# Patient Record
Sex: FEMALE | Race: OTHER | URBAN - METROPOLITAN AREA
[De-identification: names, ages, dates, MRNs, and addresses within clinical notes are randomized per-mention and may not be internally consistent; named-entity substitution may affect disease eponyms.]

---

## 2023-07-24 ENCOUNTER — OFFICE VISIT (OUTPATIENT)
Dept: ONCOLOGY | Age: 53
End: 2023-07-24
Payer: COMMERCIAL

## 2023-07-24 VITALS
SYSTOLIC BLOOD PRESSURE: 134 MMHG | WEIGHT: 163.7 LBS | TEMPERATURE: 98.9 F | HEIGHT: 60 IN | DIASTOLIC BLOOD PRESSURE: 84 MMHG | OXYGEN SATURATION: 99 % | RESPIRATION RATE: 12 BRPM | BODY MASS INDEX: 32.14 KG/M2 | HEART RATE: 69 BPM

## 2023-07-24 DIAGNOSIS — M25.569 KNEE PAIN, UNSPECIFIED CHRONICITY, UNSPECIFIED LATERALITY: ICD-10-CM

## 2023-07-24 DIAGNOSIS — C50.919 MALIGNANT NEOPLASM OF FEMALE BREAST, UNSPECIFIED ESTROGEN RECEPTOR STATUS, UNSPECIFIED LATERALITY, UNSPECIFIED SITE OF BREAST (HCC): ICD-10-CM

## 2023-07-24 DIAGNOSIS — C50.919 MALIGNANT NEOPLASM OF FEMALE BREAST, UNSPECIFIED ESTROGEN RECEPTOR STATUS, UNSPECIFIED LATERALITY, UNSPECIFIED SITE OF BREAST (HCC): Primary | ICD-10-CM

## 2023-07-24 DIAGNOSIS — R97.8 ABNORMAL TUMOR MARKERS: ICD-10-CM

## 2023-07-24 DIAGNOSIS — R94.8 ABNORMAL POSITRON EMISSION TOMOGRAPHY (PET) SCAN: ICD-10-CM

## 2023-07-24 DIAGNOSIS — M25.569 KNEE PAIN, UNSPECIFIED CHRONICITY, UNSPECIFIED LATERALITY: Primary | ICD-10-CM

## 2023-07-24 PROCEDURE — G8417 CALC BMI ABV UP PARAM F/U: HCPCS | Performed by: INTERNAL MEDICINE

## 2023-07-24 PROCEDURE — 1036F TOBACCO NON-USER: CPT | Performed by: INTERNAL MEDICINE

## 2023-07-24 PROCEDURE — 99205 OFFICE O/P NEW HI 60 MIN: CPT | Performed by: INTERNAL MEDICINE

## 2023-07-24 PROCEDURE — G8427 DOCREV CUR MEDS BY ELIG CLIN: HCPCS | Performed by: INTERNAL MEDICINE

## 2023-07-24 PROCEDURE — 3017F COLORECTAL CA SCREEN DOC REV: CPT | Performed by: INTERNAL MEDICINE

## 2023-07-24 RX ORDER — ABEMACICLIB 150 MG/1
TABLET ORAL
COMMUNITY
Start: 2023-07-17

## 2023-07-24 RX ORDER — ASCORBIC ACID 500 MG
500 TABLET ORAL DAILY
COMMUNITY

## 2023-07-24 RX ORDER — CALCIUM CARBONATE 500(1250)
500 TABLET ORAL DAILY
COMMUNITY

## 2023-07-24 RX ORDER — LAMOTRIGINE 25 MG/1
500 TABLET ORAL ONCE
COMMUNITY

## 2023-07-24 ASSESSMENT — PATIENT HEALTH QUESTIONNAIRE - PHQ9
SUM OF ALL RESPONSES TO PHQ9 QUESTIONS 1 & 2: 0
SUM OF ALL RESPONSES TO PHQ QUESTIONS 1-9: 0
2. FEELING DOWN, DEPRESSED OR HOPELESS: 0
SUM OF ALL RESPONSES TO PHQ QUESTIONS 1-9: 0
1. LITTLE INTEREST OR PLEASURE IN DOING THINGS: 0
SUM OF ALL RESPONSES TO PHQ QUESTIONS 1-9: 0
SUM OF ALL RESPONSES TO PHQ QUESTIONS 1-9: 0

## 2023-07-24 NOTE — PATIENT INSTRUCTIONS
Patient Instructions from Today's Visit    Reason for Visit:  New Patient. Diagnosis Information:  [email or web address] net/about-us/asco-answers-patient-education-materials/pkve-esgxmov-adza-sheets    Plan:  History reviewed. Proceed with fulvestrant, abemaciclib, and xgeva. PET scan soon. You can call to schedule this at [phone]. Recommend continuing with mammograms - you can call to schedule this at [phone]. Follow Up: Injection next week. Recent Lab Results:  N/A    Treatment Summary has been discussed and given to patient: N/A        -------------------------------------------------------------------------------------------------------------------  Please call our office at [phone] if you have any  of the following symptoms:   Fever of 100.5 or greater  Chills  Shortness of breath  Swelling or pain in one leg    After office hours an answering service is available and will contact a provider for emergencies or if you are experiencing any of the above symptoms. Patient did express an interest in My Chart. My Chart log in information explained on the after visit summary printout at the [address] [facility] desk.     Sumeet Nichols RN

## 2023-07-24 NOTE — PROGRESS NOTES
[facility]: New Patient - Consultation    Chief Complaint   Patient presents with    New Patient     Reason for Referral: breast cancer of upper outer quadrant of right female breast  Referring Provider: Self Referral  Primary Care Provider: N/A  Family History of Cancer/Hematologic Disorders: No known family history  Presenting Symptoms: Abnormal PET/CT scan 10/3/22; abnormal  PET/CT Whole Body 3/21/23    History of Present Illness:  Ms. Real Gilbert is a 46 y.o. female who presents today for consultation regarding breast cancer. 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 She is originally from [place]. Here with her sister. During today's visit we discussed the pathophysiology of breast cancer, staging, and the importance of receptor status in terms of treatment options. We then reviewed her medical history as well as oncologic history, recent imaging and pathology in detail. Chronological Events: (from outside records)  - 8/1/14 - mammogram screening in [place] - negative    1/30/15 - Biopsy results from the right breast at the 10-11:00 position revealed ER+(>90%), WV+(30%), Ki67 (60%), HER2 negative (1+) with HER2 FISH is negative. (CE) Diagnosis: Right breast mass, 10/11 o'clock position revealing INVASIVE DUCTAL CARCINOMA WITH ASSOCIATED HIGH GRADE DUCTAL CARCINOMA IN SITU with calcifications and comedo necrosis, see comment. Lymph node, right axilla, 16 mm (size by imaging) revealing METASTATIC MAMMARY DUCTAL CARCINOMA  - 2/11/15 - PET/CT scan did not reveal distant metastasis. S/P neoadjuvant chemotherapy: Taxol weekly x 12 followed by Dose Dense AC x 4 with Neulasta support started on 2/21/15 completed on 7/6/15.  7/30/15 - Lumpectomy: path c.w 2.2 x 1.5 x 1.2 cm tumor bed there are multiple microscopic foci of invasive carcinoma in a buckshot pattern.  There

## 2023-07-24 NOTE — PROGRESS NOTES
fulvestrant + abemaciclib. Continue Xgeva q 3 months  Will monitor tumor markers. RTC in 4 weeks with labs before. 3/13/23 - (CE)      3/21/23 - PET CT Whole Body (PET) revealing since October 3, 2022, new low-grade uptake in anatomically stable sclerotic L4 metastasis, possibly developing recurrent disease. However, other sclerotic bone lesions remain inactive, and no new lesions are seen; improved right pleural effusion and underlying atelectasis/infiltrate. (CE)    3/30/23 - Met with Oncologist, GILDARDO Romero  Recurrence of breast cancer to right axillary lymph node with bone metastasis, per PET CT scan 10/3/22 (CE) and 3/21/22 (PET/CT0 (CE)  Continues on fulvestrant, abemaciclib and Xgeva q 3 months    4/12/23 - Patient diagnosed with COVID; completed Rx: Paxlovid. (CE)    6/20/23 - TE from patient to Oncologist, GILDARDO Romero (CE)  Patient is moving to [place] and needs referral sent to [facility], consult with Dr. Jenni Cooper    7/6/23 - Telehealth visit with Oncologist, GILDARDO Romero (CE)  Reviewed recent scans and labs  Continue fulvestrant + abemaciclib, and Xgeva q 3 months  Monthly labs including TMs   RTC 4 weeks with labs before. Patient requested referral to [facility] with Dr. Jenni Cooper    7/19/23 - An urgent referral was placed to medical oncology to resume chemo for stage lV breast cancer. 7/19/23 - Met with Oncologist, Theresa Munoz. MD Janny for new patient consultation. (CE)  No consultation note was noted in EPIC or CE. A referral has been placed to [facility] for a Medical Oncology consultation and treatment. Notes from Referring Provider: None    Other Pertinent Information: None    Presented at Tumor Board:  No

## 2023-07-30 ASSESSMENT — ENCOUNTER SYMPTOMS
SHORTNESS OF BREATH: 0
DIARRHEA: 0
TROUBLE SWALLOWING: 0
NAUSEA: 0
ABDOMINAL PAIN: 0
BLOOD IN STOOL: 0
HEMOPTYSIS: 0
CHEST TIGHTNESS: 0
VOICE CHANGE: 0
CONSTIPATION: 0
SORE THROAT: 0
WHEEZING: 0
SCLERAL ICTERUS: 0
ABDOMINAL DISTENTION: 0
VOMITING: 0